# Patient Record
Sex: MALE | Race: WHITE | Employment: FULL TIME | URBAN - METROPOLITAN AREA
[De-identification: names, ages, dates, MRNs, and addresses within clinical notes are randomized per-mention and may not be internally consistent; named-entity substitution may affect disease eponyms.]

---

## 2018-04-18 ENCOUNTER — OFFICE VISIT (OUTPATIENT)
Dept: URGENT CARE | Facility: RETAIL CLINIC | Age: 33
End: 2018-04-18
Payer: COMMERCIAL

## 2018-04-18 VITALS — TEMPERATURE: 98.9 F | SYSTOLIC BLOOD PRESSURE: 112 MMHG | DIASTOLIC BLOOD PRESSURE: 71 MMHG | HEART RATE: 75 BPM

## 2018-04-18 DIAGNOSIS — J02.9 ACUTE PHARYNGITIS, UNSPECIFIED ETIOLOGY: ICD-10-CM

## 2018-04-18 DIAGNOSIS — J02.0 STREP THROAT: Primary | ICD-10-CM

## 2018-04-18 LAB — S PYO AG THROAT QL IA.RAPID: POSITIVE

## 2018-04-18 PROCEDURE — 99203 OFFICE O/P NEW LOW 30 MIN: CPT | Performed by: PHYSICIAN ASSISTANT

## 2018-04-18 PROCEDURE — 87880 STREP A ASSAY W/OPTIC: CPT | Mod: QW | Performed by: PHYSICIAN ASSISTANT

## 2018-04-18 RX ORDER — PENICILLIN V POTASSIUM 500 MG/1
500 TABLET, FILM COATED ORAL 2 TIMES DAILY
Qty: 20 TABLET | Refills: 0 | Status: SHIPPED | OUTPATIENT
Start: 2018-04-18 | End: 2018-04-28

## 2018-04-18 NOTE — NURSING NOTE
"Chief Complaint   Patient presents with     Pharyngitis     x 1 day, no fevers, son dx with strep 3 days ago       Initial /71 (BP Location: Left arm)  Pulse 75  Temp 98.9  F (37.2  C) (Tympanic) Estimated body mass index is 27.95 kg/(m^2) as calculated from the following:    Height as of 2/19/14: 5' 11.46\" (1.815 m).    Weight as of 2/19/14: 203 lb (92.1 kg).  Medication Reconciliation: complete    "

## 2018-04-18 NOTE — PROGRESS NOTES
Chief Complaint   Patient presents with     Pharyngitis     x 1 day, no fevers, son dx with strep 3 days ago      SUBJECTIVE:  Michael Parish is a 32 year old male with a chief complaint of sore throat.  Onset of symptoms was 1 day(s) ago.    Course of illness: still present.  Severity moderate  Current and Associated symptoms: sore throat last week, then worse last night, body aches  Treatment measures tried include dayquil, nyquil  Predisposing factors include one of his sons diagnosed with strep throat earlier this week, works in school  No chronic health issues    Past Medical History:   Diagnosis Date     NO ACTIVE PROBLEMS      Current Outpatient Prescriptions   Medication Sig Dispense Refill     Pseudoephedrine-APAP-DM (DAYQUIL OR)        IBUPROFEN PO Take 600 mg by mouth every 6 hours as needed for moderate pain        No Known Allergies     History   Smoking Status     Never Smoker   Smokeless Tobacco     Never Used       ROS:  CONSTITUTIONAL:POSITIVE for body aches, headache NEGATIVE for fever, chills  ENT/MOUTH: POSITIVE for sore throat and NEGATIVE for congestion  RESP:NEGATIVE for significant cough or wheezing    OBJECTIVE:   /71 (BP Location: Left arm)  Pulse 75  Temp 98.9  F (37.2  C) (Tympanic)  GENERAL APPEARANCE: healthy, alert and no distress  EYES: conjunctiva clear  HENT: ear canals and TM's normal.  Nose normal.  Pharynx erythematous with no exudate noted.  NECK: supple, non-tender to palpation, shotty adenopathy noted  RESP: lungs clear to auscultation - no rales, rhonchi or wheezes  CV: regular rates and rhythm, normal S1 S2, no murmur noted  SKIN: no suspicious lesions or rashes    Rapid Strep test is positive    ASSESSMENT:     Acute pharyngitis, unspecified etiology  Strep throat    PLAN:   Plan: penicillin V potassium (VEETID) 500 MG tablet  Take antibiotic as directed and finish entire course.  Change toothbrush after at least 24 hours of starting antibiotics.   Will be  contagious for 24 hours after starting antibiotic  Symptomatic treat with fluids, rest, acetaminophen or ibuprofen as needed.   Please follow up with primary care provider if not improving, worsening or new symptoms or for any adverse reactions to medications.         Eileen Powell PA-C  [facility] [place]

## 2018-04-18 NOTE — PATIENT INSTRUCTIONS
Take antibiotic as directed and finish entire course.  Change toothbrush after at least 24 hours of starting antibiotics.   Will be contagious for 24 hours after starting antibiotic  Symptomatic treat with fluids, rest, acetaminophen or ibuprofen as needed.   Please follow up with primary care provider if not improving, worsening or new symptoms or for any adverse reactions to medications.

## 2018-04-18 NOTE — MR AVS SNAPSHOT
"              After Visit Summary   2018    Michael Parish    MRN: [id number]           Patient Information     Date Of Birth          1985        Visit Information        Provider Department      2018 5:00 PM Eileen Powell PA-C [facility] [place]        Today's Diagnoses     Strep throat    -  1    Acute pharyngitis, unspecified etiology          Care Instructions    Take antibiotic as directed and finish entire course.  Change toothbrush after at least 24 hours of starting antibiotics.   Will be contagious for 24 hours after starting antibiotic  Symptomatic treat with fluids, rest, acetaminophen or ibuprofen as needed.   Please follow up with primary care provider if not improving, worsening or new symptoms or for any adverse reactions to medications.            Follow-ups after your visit        Who to contact     You can reach your care team any time of the day by calling [phone].  Notification of test results:  If you have an abnormal lab result, we will notify you by phone as soon as possible.         Additional Information About Your Visit        MyChart Information     [employer] lets you send messages to your doctor, view your test results, renew your prescriptions, schedule appointments and more. To sign up, go to [email or web address] . Click on \"Log in\" on the left side of the screen, which will take you to the Welcome page. Then click on \"Sign up Now\" on the right side of the page.     You will be asked to enter the access code listed below, as well as some personal information. Please follow the directions to create your username and password.     Your access code is: [email or web address]  Expires: 2018  5:29 PM     Your access code will  in 90 days. If you need help or a new code, please call your [facility] or [phone].        Care EveryWhere ID     This is your Care EveryWhere ID. This could be used by other organizations to access your [facility] " medical records  [id number]        Your Vitals Were     Pulse Temperature                75 98.9  F (37.2  C) (Tympanic)           Blood Pressure from Last 3 Encounters:   04/18/18 112/71   11/02/14 123/77   02/19/14 118/60    Weight from Last 3 Encounters:   02/19/14 203 lb (92.1 kg)   08/06/13 214 lb (97.1 kg)   12/01/09 193 lb (87.5 kg)              We Performed the Following     RAPID STREP SCREEN          Today's Medication Changes          These changes are accurate as of 4/18/18  5:29 PM.  If you have any questions, ask your nurse or doctor.               Start taking these medicines.        Dose/Directions    penicillin V potassium 500 MG tablet   Commonly known as:  VEETID   Used for:  Strep throat        Dose:  500 mg   Take 1 tablet (500 mg) by mouth 2 times daily for 10 days   Quantity:  20 tablet   Refills:  0            Where to get your medicines      These medications were sent to [id number] - [place], [place] - [address]  [address] [address] [address]     Phone:  [phone]     penicillin V potassium 500 MG tablet                Primary Care Provider Fax #    Physician No Ref-Primary [phone]       No address on file        Equal Access to Services     JAUN GANDHI : Wilfrido crafto [place], waaxda luqadaha, qaybta kaalmada adeegyada, jasmyne taveras. So [facility] [phone].    ATENCIÓN: Si habla español, tiene a minaay disposición servicios gratuitos de asistencia lingüística. Mehrdad al [phone].    We comply with applicable federal civil rights laws and [place] laws. We do not discriminate on the basis of race, color, national origin, age, disability, sex, sexual orientation, or gender identity.            Thank you!     Thank you for choosing [facility]  for your care. Our goal is always to provide you with excellent care. Hearing back from our patients is one way we can continue to improve our services. Please  take a few minutes to complete the written survey that you may receive in the mail after your visit with us. Thank you!             Your Updated Medication List - Protect others around you: Learn how to safely use, store and throw away your medicines at www.disposemymeds.org.          This list is accurate as of 4/18/18  5:29 PM.  Always use your most recent med list.                   Brand Name Dispense Instructions for use Diagnosis    DAYQUIL OR           IBUPROFEN PO      Take 600 mg by mouth every 6 hours as needed for moderate pain        penicillin V potassium 500 MG tablet    VEETID    20 tablet    Take 1 tablet (500 mg) by mouth 2 times daily for 10 days    Strep throat

## 2020-12-13 ENCOUNTER — HEALTH MAINTENANCE LETTER (OUTPATIENT)
Age: 35
End: 2020-12-13

## 2021-09-26 ENCOUNTER — HEALTH MAINTENANCE LETTER (OUTPATIENT)
Age: 36
End: 2021-09-26

## 2022-01-16 ENCOUNTER — HEALTH MAINTENANCE LETTER (OUTPATIENT)
Age: 37
End: 2022-01-16

## 2023-01-14 ENCOUNTER — HEALTH MAINTENANCE LETTER (OUTPATIENT)
Age: 38
End: 2023-01-14

## 2023-04-23 ENCOUNTER — HEALTH MAINTENANCE LETTER (OUTPATIENT)
Age: 38
End: 2023-04-23